# Patient Record
(demographics unavailable — no encounter records)

---

## 2024-11-18 NOTE — DISCUSSION/SUMMARY
[de-identified] : History, clinical examination and imaging were most consistent with: -right knee IT band tendonitis with friction syndrome   The diagnosis was explained in detail. The potential non-surgical and surgical treatments were reviewed. The relative risks and benefits of each option were considered relative to the patients age, activity level, medical history, symptom severity and previously attempted treatments.   The patient was advised to consult with their primary medical provider prior to initiation of any new medications to reduce the risk of adverse effects specific to their long-term home medications and medical history. The risk of gastrointestinal irritation and kidney injury specific to long-term NSAID use was discussed.   -MRI of the knee ordered to evaluate for meniscus and/or ligament tear. -Topical voltaren as needed for pain. -Continue HEP.  -Follow up when imaging completed, in absence of meniscal flap tear, consider PT before surgical options.    (MDM)   Problem Complexity -Moderate: chronic illness with exacerbation   Risk -Low: over the counter medication

## 2024-11-18 NOTE — IMAGING
[Right] : right knee [All Views] : anteroposterior, lateral, skyline, and anteroposterior standing [There are no fractures, subluxations or dislocations. No significant abnormalities are seen] : There are no fractures, subluxations or dislocations. No significant abnormalities are seen [de-identified] : (Exam: Knee)   Laterality is right    Patient is in no acute distress, alert and oriented Sensation is grossly intact to light touch in the foot Motor function is 5/5 in the foot Capillary refill is less than 2 seconds in the toes Lymphadenopathy is not present Peripheral edema is not present   Skin is intact Effusion is not present Atrophy is not present Antalgic gait is not present   Medial joint line tenderness is not present Lateral joint line tenderness is not present IT band tenderness is present Patellar tenderness is not present Calf tenderness is not present   Knee extension is 0 Knee flexion is 135   Quadriceps strength is 5/5 Hamstring strength is 5/5   Lachman is normal Posterior drawer is normal Varus stress stability is normal Valgus stress stability is normal   Medial Carter test is normal Lateral Carter test is normal Patellofemoral grind test is normal Patellar apprehension test is normal

## 2024-11-18 NOTE — HISTORY OF PRESENT ILLNESS
[de-identified] : 11/18/2024: f/u for right knee. Symptoms have worsened since last visit. patient is doing HEP. Taking Ibuprofen prn.   Date of evaluation 06/24/2024 Patient age in years is 37 Occupation is  Body part causing symptoms is the right knee Symptoms began 4 days ago, after playing hockey  Location of pain is lateral He reports clicking on the outer part of the thigh Quality of pain is dull Pain score at rest is 2/10 Pain score during activity is 7/10 Radicular symptoms are not present Prior treatments include Motrin  Patient's condition is not associated with workers compensation, no-fault or interscholastic athletics

## 2024-12-06 NOTE — IMAGING
[Right] : right knee [All Views] : anteroposterior, lateral, skyline, and anteroposterior standing [There are no fractures, subluxations or dislocations. No significant abnormalities are seen] : There are no fractures, subluxations or dislocations. No significant abnormalities are seen [de-identified] : (Exam: Knee)   Laterality is right    Patient is in no acute distress, alert and oriented Sensation is grossly intact to light touch in the foot Motor function is 5/5 in the foot Capillary refill is less than 2 seconds in the toes Lymphadenopathy is not present Peripheral edema is not present   Skin is intact Effusion is not present Atrophy is not present Antalgic gait is not present   Medial joint line tenderness is not present Lateral joint line tenderness is not present IT band tenderness is present Patellar tenderness is not present Calf tenderness is not present   Knee extension is 0 Knee flexion is 135   Quadriceps strength is 5/5 Hamstring strength is 5/5   Lachman is normal Posterior drawer is normal Varus stress stability is normal Valgus stress stability is normal   Medial Carter test is normal Lateral Carter test is normal Patellofemoral grind test is normal Patellar apprehension test is normal

## 2024-12-06 NOTE — HISTORY OF PRESENT ILLNESS
[de-identified] : 12/06/2024: f/u for right knee, MRI results.  taking Ibuprofen prn.  reports symptoms have worsened since last visit.    11/18/2024: f/u for right knee. Symptoms have worsened since last visit. patient is doing HEP. Taking Ibuprofen prn.   Date of evaluation 06/24/2024 Patient age in years is 37 Occupation is  Body part causing symptoms is the right knee Symptoms began 4 days ago, after playing hockey  Location of pain is lateral He reports clicking on the outer part of the thigh Quality of pain is dull Pain score at rest is 2/10 Pain score during activity is 7/10 Radicular symptoms are not present Prior treatments include Motrin  Patient's condition is not associated with workers compensation, no-fault or interscholastic athletics

## 2024-12-06 NOTE — DATA REVIEWED
[MRI] : MRI [Right] : of the right [Knee] : knee [I independently reviewed and interpreted images and report] : I independently reviewed and interpreted images and report [FreeTextEntry1] : undersurface medial mensicus posterior horn tear, large effusion, mild patellofemoral OA

## 2024-12-06 NOTE — DISCUSSION/SUMMARY
[de-identified] : History, clinical examination and imaging were most consistent with: -right knee medial mensicus tear, mild patellofemoral osteoarthritis   The diagnosis was explained in detail. The potential non-surgical and surgical treatments were reviewed. The relative risks and benefits of each option were considered relative to the patients age, activity level, medical history, symptom severity and previously attempted treatments.   The patient was advised to consult with their primary medical provider prior to initiation of any new medications to reduce the risk of adverse effects specific to their long-term home medications and medical history. The risk of gastrointestinal irritation and kidney injury specific to long-term NSAID use was discussed.   -Surgical treatment was selected. The patient's current symptoms have placed a significant burden on their quality of life. The patient expressed a strong preference for a definitive treatment to facilitate improvement in function and pain. The results of non-surgical treatment of this condition were discussed. The risks, benefits and alternatives of the planned surgical procedure were discussed, along with the expected timeline for rehabilitation and specifics regarding the most common complications. The patient agreed to participate in post-operative physical therapy. -Discussed that patient is a candidate for: knee arthroscopy with partial medial meniscectomy. -Discussed that pain due to his patellofemoral osteoarthritis may not completely resolve with arthroscopic surgery. However, cartilage restoration and TKA are not recommended at this time due to his young age and desire for quick return to activities. -Follow up in clinic post-operatively.    (MDM) Problem Complexity -Moderate: chronic illness with exacerbation   Risk -Moderate: pre-surgical discussion

## 2024-12-20 NOTE — IMAGING
[Right] : right knee [All Views] : anteroposterior, lateral, skyline, and anteroposterior standing [There are no fractures, subluxations or dislocations. No significant abnormalities are seen] : There are no fractures, subluxations or dislocations. No significant abnormalities are seen [de-identified] : (Exam: Knee)   Laterality is right    Patient is in no acute distress, alert and oriented Sensation is grossly intact to light touch in the foot Motor function is 5/5 in the foot Capillary refill is less than 2 seconds in the toes Lymphadenopathy is not present Peripheral edema is not present   Skin is intact Effusion is not present Atrophy is not present Antalgic gait is not present   Medial joint line tenderness is not present Lateral joint line tenderness is not present IT band tenderness is present Patellar tenderness is not present Calf tenderness is not present   Knee extension is 0 Knee flexion is 135   Quadriceps strength is 5/5 Hamstring strength is 5/5   Lachman is normal Posterior drawer is normal Varus stress stability is normal Valgus stress stability is normal   Medial Carter test is normal Lateral Carter test is normal Patellofemoral grind test is normal Patellar apprehension test is normal

## 2024-12-20 NOTE — HISTORY OF PRESENT ILLNESS
[de-identified] : 12/20: f/u right knee. patient wants to defer surgical options until the spring due to personal oblogations. reports mild ongoing knee pain.  12/06/2024: f/u for right knee, MRI results.  taking Ibuprofen prn.  reports symptoms have worsened since last visit.    11/18/2024: f/u for right knee. Symptoms have worsened since last visit. patient is doing HEP. Taking Ibuprofen prn.   Date of evaluation 06/24/2024 Patient age in years is 37 Occupation is  Body part causing symptoms is the right knee Symptoms began 4 days ago, after playing hockey  Location of pain is lateral He reports clicking on the outer part of the thigh Quality of pain is dull Pain score at rest is 2/10 Pain score during activity is 7/10 Radicular symptoms are not present Prior treatments include Motrin  Patient's condition is not associated with workers compensation, no-fault or interscholastic athletics

## 2024-12-20 NOTE — DISCUSSION/SUMMARY
[de-identified] : History, clinical examination and imaging were most consistent with: -right knee medial mensicus tear, mild patellofemoral osteoarthritis   The diagnosis was explained in detail. The potential non-surgical and surgical treatments were reviewed. The relative risks and benefits of each option were considered relative to the patients age, activity level, medical history, symptom severity and previously attempted treatments.   The patient was advised to consult with their primary medical provider prior to initiation of any new medications to reduce the risk of adverse effects specific to their long-term home medications and medical history. The risk of gastrointestinal irritation and kidney injury specific to long-term NSAID use was discussed.   -Cortisone injection performed today for symptom relief.  -Discussed that if symptoms persist we would revisit knee arthroscopy with partial medial meniscectomy. Discussed that pain due to his patellofemoral osteoarthritis may not completely resolve with arthroscopic surgery.  -Follow up in 3 months, revisit surgical options if symptoms persist.    (MDM) Problem Complexity -Moderate: chronic illness with exacerbation   Risk -Moderate: injection  (Procedure: Corticosteroid Injection)   Injection location was the: -right knee joint   Injection contents were: 40 mg of kenalog and 5 mL of 1% lidocaine   Procedure Details: -Informed consent was obtained. Discussed possible risks of corticosteroid injection including hyperglycemia, infection and skin discoloration. -Discussed that due to infection risk, an interval between injection and any future surgery is 6 weeks for arthroscopy and 3 months for arthroplasty. -Injection performed using aseptic technique. Hemostasis was confirmed. -Procedure was tolerated well with no complications.

## 2025-04-21 NOTE — HISTORY OF PRESENT ILLNESS
[de-identified] : 04/21/2025: f/u for right knee. s/p right knee arthroscopy with partial medial meniscectomy on 04/09/2025. Denies fever, chills and SOB, 5/10 pain. Taking Tylenol and aspirin prn. Patient will go to [facility]  12/20: f/u right knee. patient wants to defer surgical options until the spring due to personal obligations. reports mild ongoing knee pain.  12/06/2024: f/u for right knee, MRI results.  taking Ibuprofen prn.  reports symptoms have worsened since last visit.    11/18/2024: f/u for right knee. Symptoms have worsened since last visit. patient is doing HEP. Taking Ibuprofen prn.   Date of evaluation 06/24/2024 Patient age in years is 37 Occupation is  Body part causing symptoms is the right knee Symptoms began 4 days ago, after playing hockey  Location of pain is lateral He reports clicking on the outer part of the thigh Quality of pain is dull Pain score at rest is 2/10 Pain score during activity is 7/10 Radicular symptoms are not present Prior treatments include Motrin  Patient's condition is not associated with workers compensation, no-fault or interscholastic athletics

## 2025-04-21 NOTE — IMAGING
[Right] : right knee [All Views] : anteroposterior, lateral, skyline, and anteroposterior standing [There are no fractures, subluxations or dislocations. No significant abnormalities are seen] : There are no fractures, subluxations or dislocations. No significant abnormalities are seen [de-identified] : (Exam: Knee)  Laterality is right    Patient is in no acute distress, alert and oriented Sensation is grossly intact to light touch in the foot Motor function is 5/5 in the foot Capillary refill is less than 2 seconds in the toes Lymphadenopathy is not present Peripheral edema is not present   Skin is intact Incisions are healing well Effusion is trace Quadriceps atrophy is present Calf tenderness is not present   Knee extension is 3 Knee flexion is 90   Quadriceps strength is 4/5 Hamstring strength is 4/5   Lachman is normal Posterior drawer is normal Varus stress stability is normal Valgus stress stability is normal

## 2025-04-21 NOTE — DISCUSSION/SUMMARY
[de-identified] : (Impression) Patient is making expected post-operative progress.  Discussed that he had osteoarthritis intraoperatively and this may predispose to future pain.   (Plan)  -Physical therapy as per the post-operative protocol. -Ibuprofen and acetaminophen as needed for pain. -Follow up in 4 weeks without XR

## 2025-05-23 NOTE — HISTORY OF PRESENT ILLNESS
[de-identified] : 05/23/2025: f/u for right knee. PT 1x a week. Symptoms are improving, minimal pain. riding exercise bike.   04/21/2025: f/u for right knee. s/p right knee arthroscopy with partial medial meniscectomy on 04/09/2025. Denies fever, chills and SOB, 5/10 pain. Taking Tylenol and aspirin prn. Patient will go to [facility].  12/20: f/u right knee. patient wants to defer surgical options until the spring due to personal obligations. reports mild ongoing knee pain.  12/06/2024: f/u for right knee, MRI results.  taking Ibuprofen prn.  reports symptoms have worsened since last visit.    11/18/2024: f/u for right knee. Symptoms have worsened since last visit. patient is doing HEP. Taking Ibuprofen prn.   Date of evaluation 06/24/2024 Patient age in years is 37 Occupation is  Body part causing symptoms is the right knee Symptoms began 4 days ago, after playing hockey  Location of pain is lateral He reports clicking on the outer part of the thigh Quality of pain is dull Pain score at rest is 2/10 Pain score during activity is 7/10 Radicular symptoms are not present Prior treatments include Motrin  Patient's condition is not associated with workers compensation, no-fault or interscholastic athletics

## 2025-05-23 NOTE — IMAGING
[de-identified] : (Exam: Knee)  Laterality is right    Patient is in no acute distress, alert and oriented Sensation is grossly intact to light touch in the foot Motor function is 5/5 in the foot Capillary refill is less than 2 seconds in the toes Lymphadenopathy is not present Peripheral edema is not present   Skin is intact Incisions are healed Effusion is not present Quadriceps atrophy is mild Calf tenderness is not present   Knee extension is 0 Knee flexion is 120   Quadriceps strength is 5-/5 Hamstring strength is 5/5   Lachman is normal Posterior drawer is normal Varus stress stability is normal Valgus stress stability is normal

## 2025-05-23 NOTE — DISCUSSION/SUMMARY
[de-identified] : (Impression) Patient is making expected post-operative progress.  Discussed that he had osteoarthritis intraoperatively and this may predispose to future pain.   (Plan)  -Transition from PT to HEP. Recommend further strengthening.  -Ibuprofen and acetaminophen as needed for pain. -Follow up as needed.